# Patient Record
Sex: MALE | Race: WHITE
[De-identification: names, ages, dates, MRNs, and addresses within clinical notes are randomized per-mention and may not be internally consistent; named-entity substitution may affect disease eponyms.]

---

## 2019-02-27 ENCOUNTER — HOSPITAL ENCOUNTER (EMERGENCY)
Dept: HOSPITAL 89 - ER | Age: 39
Discharge: HOME | End: 2019-02-27
Payer: COMMERCIAL

## 2019-02-27 VITALS — SYSTOLIC BLOOD PRESSURE: 138 MMHG | DIASTOLIC BLOOD PRESSURE: 95 MMHG

## 2019-02-27 DIAGNOSIS — J09.X2: Primary | ICD-10-CM

## 2019-02-27 PROCEDURE — 94640 AIRWAY INHALATION TREATMENT: CPT

## 2019-02-27 PROCEDURE — 71046 X-RAY EXAM CHEST 2 VIEWS: CPT

## 2019-02-27 PROCEDURE — 87502 INFLUENZA DNA AMP PROBE: CPT

## 2019-02-27 PROCEDURE — 99283 EMERGENCY DEPT VISIT LOW MDM: CPT

## 2019-02-27 NOTE — RADIOLOGY IMAGING REPORT
[place] [facility] [place] 

 

PATIENT NAME: Sam Isabel

: 1980

MR: [id number]

V: [id number]

EXAM DATE: 

ORDERING PHYSICIAN: BRIGIDA COHN

TECHNOLOGIST: 

 

Location: [facility] - [place]

Patient: Sam Isabel

: 1980

MRN: [id number]

Visit/Account:[id number]

Date of Sevice:  2019

 

ACCESSION #: [id number]

 

2 VIEWS CHEST

 

INDICATION: Shortness breath and cough. Positive fluid test.

 

COMPARISON: None available

 

FINDINGS:

Cardiomediastinal silhouette and pulmonary vessels within normal limits.

Lung volumes decreased bilaterally causing some accentuation to the interstitium. No focal areas of c
onsolidation.

There is no pneumothorax or pleural effusion.

No nodule. Small scar seen in left lower lobe.

Upper abdomen is unremarkable. No acute bony abnormality. Multiple old right rib fractures.

 

IMPRESSION:

1. Low lung volumes without indication of acute cardiopulmonary disease.

 

Report Dictated By: Geoffrey Mccarty at 2019 4:42 PM

 

Report E-Signed By: Geoffrey Mccarty  at 2019 4:44 PM

 

WSN:[id number]

## 2019-02-27 NOTE — ER REPORT
History and Physical


Time Seen By MD:  15:50


Hx. of Stated Complaint:  


PATIENT REPORTS FLU LIKE SYMPTOMS WITH COUGH AND BODY ACHES FOR 2 DAYS.  WAS 


EXPOSED FROM FAMILY


HPI/ROS


CHIEF COMPLAINT: Cough fever or flulike symptoms





HISTORY OF PRESENT ILLNESS: 38-year-old male several day history of cough fever 


chills or flulike symptoms has 2 sick children both with a fluid home history 


over-the-counter cold medicines with little to no benefit cough and fevers 


gotten worse MAXIMUM TEMPERATURE reportedly at home is 103 eyes 101.2 on arrival


here patient is feeling tired and lethargic has general malaise joint pain or 


discomfort no additional complaints noted. Cough is productive of thick sputum





REVIEW OF SYSTEMS:


Respiratory: has cough, no dyspnea.


Cardiovascular: No chest pain, no palpitations.


Gastrointestinal: No vomiting, no abdominal pain.


Musculoskeletal: No back pain.


Remainder of the 14 system rev:  Yes


Allergies:  


Coded Allergies:  


     No Known Drug Allergies (Unverified , 2/27/19)


Reviewed Nurses Notes:  Yes


Old Medical Records Reviewed:  Yes


Hx Substance Use Disorder:  No


Hx Alcohol Use:  No


Constitutional





Vital Sign - Last 24 Hours








 2/27/19 2/27/19 2/27/19 2/27/19





 15:51 15:55 15:55 16:01


 


Temp 101.1   


 


Pulse 125  101 103


 


Resp 20  20 18


 


B/P (MAP) 168/109   


 


Pulse Ox 92 95  


 


O2 Delivery Room Air Nasal Cannula  


 


O2 Flow Rate  3.0  


 


    





 2/27/19   





 16:01   


 


Pulse Ox 94   


 


O2 Delivery Nasal Cannula   


 


O2 Flow Rate 2.0   








Physical Exam


  General Appearance: The patient is alert, has no immediate need for airway 


protection and no current signs of toxicity. Appears ill


Eyes: Pupils equal and round no injection.


Respiratory: Some mild coarse breath sounds and expiratory wheeze noted 


primarily left greater than right


Cardiac: regular rate and rhythm [ ]


Gastrointestinal: Abdomen is soft and non tender, no masses, bowel sounds 


normal.


Musculoskeletal:  Neck: Neck is supple and non tender.


   Extremities have full range of motion and are non tender.


Skin: No rashes or lesions.


[ ]


DIFFERENTIAL DIAGNOSIS: After history and physical exam differential diagnosis 


was considered for influenza bronchitis pneumonia





Medical Decision Making


Data Points


Laboratory





Hematology








Test


 2/27/19


15:44


 


Influenza Virus Type A (PCR)


 Positive


(NEGATIVE)


 


Influenza Virus Type B (PCR)


 Negative


(NEGATIVE)








Chemistry








Test


 2/27/19


15:44


 


Influenza Virus Type A (PCR)


 Positive


(NEGATIVE)


 


Influenza Virus Type B (PCR)


 Negative


(NEGATIVE)











ED Course/Re-evaluation


ED Course


ED course 28-year-old male with flulike symptoms he is tested positive for and 


phlegms Y chest x-ray shows no acute coronary respiratory processes patient 


diagnosed with flu over-the-counter treatment remedies and evaluated no TheraFlu


needed this time is spent 3 days into his symptomatology


Decision to Disposition Date:  Feb 27, 2019


Decision to Disposition Time:  16:52





Depart


Departure


Latest Vital Signs





Vital Signs








  Date Time  Temp Pulse Resp B/P (MAP) Pulse Ox O2 Delivery O2 Flow Rate FiO2


 


2/27/19 16:01     94 Nasal Cannula 2.0 


 


2/27/19 16:01  103 18     


 


2/27/19 15:51 101.1   168/109    








Impression:  


   Primary Impression:  


   Influenza


Condition:  Improved


Disposition:  HOME OR SELF-CARE


Referrals:  


LEDY MALONE


5 Days


Patient Instructions:  Influenza (DC)











BRIGIDA COHN MD           Feb 27, 2019 16:00